# Patient Record
Sex: FEMALE | Race: WHITE | Employment: FULL TIME | URBAN - METROPOLITAN AREA
[De-identification: names, ages, dates, MRNs, and addresses within clinical notes are randomized per-mention and may not be internally consistent; named-entity substitution may affect disease eponyms.]

---

## 2020-05-18 ENCOUNTER — HOSPITAL ENCOUNTER (OUTPATIENT)
Dept: ULTRASOUND IMAGING | Age: 55
Discharge: HOME | End: 2020-05-18
Payer: COMMERCIAL

## 2020-05-18 PROCEDURE — 76705 ECHO EXAM OF ABDOMEN: CPT

## 2022-10-12 ENCOUNTER — HOSPITAL ENCOUNTER (OUTPATIENT)
Age: 57
Setting detail: OBSERVATION
Discharge: HOME | End: 2022-10-12
Attending: EMERGENCY MEDICINE | Admitting: OBSTETRICS & GYNECOLOGY
Payer: COMMERCIAL

## 2022-10-12 ENCOUNTER — ANESTHESIA EVENT (OUTPATIENT)
Dept: OPERATING ROOM | Age: 57
End: 2022-10-12
Payer: COMMERCIAL

## 2022-10-12 ENCOUNTER — ANESTHESIA (OUTPATIENT)
Dept: OPERATING ROOM | Age: 57
End: 2022-10-12
Payer: COMMERCIAL

## 2022-10-12 VITALS
TEMPERATURE: 98.4 F | RESPIRATION RATE: 18 BRPM | HEART RATE: 68 BPM | OXYGEN SATURATION: 99 % | BODY MASS INDEX: 27 KG/M2 | WEIGHT: 143 LBS | HEIGHT: 61 IN | DIASTOLIC BLOOD PRESSURE: 68 MMHG | SYSTOLIC BLOOD PRESSURE: 118 MMHG

## 2022-10-12 DIAGNOSIS — N99.820 POSTOPERATIVE HEMORRHAGE INVOLVING GENITOURINARY SYSTEM FOLLOWING GENITOURINARY PROCEDURE: Primary | ICD-10-CM

## 2022-10-12 PROBLEM — N89.8 VAGINAL HEMATOMA: Status: ACTIVE | Noted: 2022-10-12

## 2022-10-12 LAB
ABO/RH: NORMAL
ANTIBODY SCREEN: NEGATIVE
BASOPHILS ABSOLUTE: 0 K/CU MM
BASOPHILS RELATIVE PERCENT: 0.3 % (ref 0–1)
DIFFERENTIAL TYPE: ABNORMAL
EOSINOPHILS ABSOLUTE: 0.1 K/CU MM
EOSINOPHILS RELATIVE PERCENT: 0.8 % (ref 0–3)
HCT VFR BLD CALC: 36.5 % (ref 37–47)
HEMOGLOBIN: 12 GM/DL (ref 12.5–16)
IMMATURE NEUTROPHIL %: 0.2 % (ref 0–0.43)
LYMPHOCYTES ABSOLUTE: 2.3 K/CU MM
LYMPHOCYTES RELATIVE PERCENT: 25.9 % (ref 24–44)
MCH RBC QN AUTO: 30.5 PG (ref 27–31)
MCHC RBC AUTO-ENTMCNC: 32.9 % (ref 32–36)
MCV RBC AUTO: 92.9 FL (ref 78–100)
MONOCYTES ABSOLUTE: 1 K/CU MM
MONOCYTES RELATIVE PERCENT: 10.7 % (ref 0–4)
NUCLEATED RBC %: 0 %
PDW BLD-RTO: 13.7 % (ref 11.7–14.9)
PLATELET # BLD: 324 K/CU MM (ref 140–440)
PMV BLD AUTO: 9.9 FL (ref 7.5–11.1)
RBC # BLD: 3.93 M/CU MM (ref 4.2–5.4)
SEGMENTED NEUTROPHILS ABSOLUTE COUNT: 5.5 K/CU MM
SEGMENTED NEUTROPHILS RELATIVE PERCENT: 62.1 % (ref 36–66)
TOTAL IMMATURE NEUTOROPHIL: 0.02 K/CU MM
TOTAL NUCLEATED RBC: 0 K/CU MM
WBC # BLD: 8.9 K/CU MM (ref 4–10.5)

## 2022-10-12 PROCEDURE — 96372 THER/PROPH/DIAG INJ SC/IM: CPT

## 2022-10-12 PROCEDURE — 3700000000 HC ANESTHESIA ATTENDED CARE: Performed by: OBSTETRICS & GYNECOLOGY

## 2022-10-12 PROCEDURE — 2709999900 HC NON-CHARGEABLE SUPPLY: Performed by: OBSTETRICS & GYNECOLOGY

## 2022-10-12 PROCEDURE — 2580000003 HC RX 258: Performed by: OBSTETRICS & GYNECOLOGY

## 2022-10-12 PROCEDURE — 6370000000 HC RX 637 (ALT 250 FOR IP): Performed by: OBSTETRICS & GYNECOLOGY

## 2022-10-12 PROCEDURE — 96374 THER/PROPH/DIAG INJ IV PUSH: CPT

## 2022-10-12 PROCEDURE — G0378 HOSPITAL OBSERVATION PER HR: HCPCS

## 2022-10-12 PROCEDURE — 7100000011 HC PHASE II RECOVERY - ADDTL 15 MIN: Performed by: OBSTETRICS & GYNECOLOGY

## 2022-10-12 PROCEDURE — 99285 EMERGENCY DEPT VISIT HI MDM: CPT

## 2022-10-12 PROCEDURE — 6360000002 HC RX W HCPCS: Performed by: OBSTETRICS & GYNECOLOGY

## 2022-10-12 PROCEDURE — 96375 TX/PRO/DX INJ NEW DRUG ADDON: CPT

## 2022-10-12 PROCEDURE — 86850 RBC ANTIBODY SCREEN: CPT

## 2022-10-12 PROCEDURE — 86901 BLOOD TYPING SEROLOGIC RH(D): CPT

## 2022-10-12 PROCEDURE — 86900 BLOOD TYPING SEROLOGIC ABO: CPT

## 2022-10-12 PROCEDURE — 85025 COMPLETE CBC W/AUTO DIFF WBC: CPT

## 2022-10-12 PROCEDURE — 3600000012 HC SURGERY LEVEL 2 ADDTL 15MIN: Performed by: OBSTETRICS & GYNECOLOGY

## 2022-10-12 PROCEDURE — 3600000002 HC SURGERY LEVEL 2 BASE: Performed by: OBSTETRICS & GYNECOLOGY

## 2022-10-12 PROCEDURE — 3700000001 HC ADD 15 MINUTES (ANESTHESIA): Performed by: OBSTETRICS & GYNECOLOGY

## 2022-10-12 PROCEDURE — 2500000003 HC RX 250 WO HCPCS: Performed by: OBSTETRICS & GYNECOLOGY

## 2022-10-12 PROCEDURE — 7100000010 HC PHASE II RECOVERY - FIRST 15 MIN: Performed by: OBSTETRICS & GYNECOLOGY

## 2022-10-12 PROCEDURE — 6360000002 HC RX W HCPCS: Performed by: EMERGENCY MEDICINE

## 2022-10-12 PROCEDURE — 6360000002 HC RX W HCPCS

## 2022-10-12 RX ORDER — ONDANSETRON 4 MG/1
4 TABLET, ORALLY DISINTEGRATING ORAL EVERY 8 HOURS PRN
Status: DISCONTINUED | OUTPATIENT
Start: 2022-10-12 | End: 2022-10-12 | Stop reason: HOSPADM

## 2022-10-12 RX ORDER — CLINDAMYCIN PHOSPHATE 900 MG/50ML
900 INJECTION INTRAVENOUS
Status: COMPLETED | OUTPATIENT
Start: 2022-10-12 | End: 2022-10-12

## 2022-10-12 RX ORDER — DOCUSATE SODIUM 100 MG/1
100 CAPSULE, LIQUID FILLED ORAL 2 TIMES DAILY
Status: DISCONTINUED | OUTPATIENT
Start: 2022-10-12 | End: 2022-10-12 | Stop reason: HOSPADM

## 2022-10-12 RX ORDER — SODIUM CHLORIDE 9 MG/ML
INJECTION, SOLUTION INTRAVENOUS PRN
Status: DISCONTINUED | OUTPATIENT
Start: 2022-10-12 | End: 2022-10-12 | Stop reason: HOSPADM

## 2022-10-12 RX ORDER — KETOROLAC TROMETHAMINE 30 MG/ML
30 INJECTION, SOLUTION INTRAMUSCULAR; INTRAVENOUS ONCE
Status: DISCONTINUED | OUTPATIENT
Start: 2022-10-12 | End: 2022-10-12

## 2022-10-12 RX ORDER — LIDOCAINE HYDROCHLORIDE 20 MG/ML
INJECTION, SOLUTION INTRAVENOUS PRN
Status: DISCONTINUED | OUTPATIENT
Start: 2022-10-12 | End: 2022-10-12 | Stop reason: SDUPTHER

## 2022-10-12 RX ORDER — OXYCODONE HYDROCHLORIDE 5 MG/1
5 TABLET ORAL EVERY 4 HOURS PRN
Status: DISCONTINUED | OUTPATIENT
Start: 2022-10-12 | End: 2022-10-12 | Stop reason: HOSPADM

## 2022-10-12 RX ORDER — CIPROFLOXACIN 500 MG/1
500 TABLET, FILM COATED ORAL 2 TIMES DAILY
Qty: 10 TABLET | Refills: 0 | Status: SHIPPED | OUTPATIENT
Start: 2022-10-12 | End: 2022-10-17

## 2022-10-12 RX ORDER — SODIUM CHLORIDE 0.9 % (FLUSH) 0.9 %
5-40 SYRINGE (ML) INJECTION PRN
Status: DISCONTINUED | OUTPATIENT
Start: 2022-10-12 | End: 2022-10-12 | Stop reason: HOSPADM

## 2022-10-12 RX ORDER — KETOROLAC TROMETHAMINE 30 MG/ML
30 INJECTION, SOLUTION INTRAMUSCULAR; INTRAVENOUS ONCE
Status: COMPLETED | OUTPATIENT
Start: 2022-10-12 | End: 2022-10-12

## 2022-10-12 RX ORDER — PROPOFOL 10 MG/ML
INJECTION, EMULSION INTRAVENOUS PRN
Status: DISCONTINUED | OUTPATIENT
Start: 2022-10-12 | End: 2022-10-12 | Stop reason: SDUPTHER

## 2022-10-12 RX ORDER — SODIUM CHLORIDE 0.9 % (FLUSH) 0.9 %
5-40 SYRINGE (ML) INJECTION EVERY 12 HOURS SCHEDULED
Status: DISCONTINUED | OUTPATIENT
Start: 2022-10-12 | End: 2022-10-12 | Stop reason: HOSPADM

## 2022-10-12 RX ORDER — MORPHINE SULFATE 4 MG/ML
4 INJECTION, SOLUTION INTRAMUSCULAR; INTRAVENOUS ONCE
Status: DISCONTINUED | OUTPATIENT
Start: 2022-10-12 | End: 2022-10-12

## 2022-10-12 RX ORDER — OXYCODONE HYDROCHLORIDE 5 MG/1
10 TABLET ORAL EVERY 4 HOURS PRN
Status: DISCONTINUED | OUTPATIENT
Start: 2022-10-12 | End: 2022-10-12 | Stop reason: HOSPADM

## 2022-10-12 RX ORDER — ONDANSETRON 2 MG/ML
4 INJECTION INTRAMUSCULAR; INTRAVENOUS EVERY 6 HOURS PRN
Status: DISCONTINUED | OUTPATIENT
Start: 2022-10-12 | End: 2022-10-12 | Stop reason: HOSPADM

## 2022-10-12 RX ORDER — KETOROLAC TROMETHAMINE 30 MG/ML
30 INJECTION, SOLUTION INTRAMUSCULAR; INTRAVENOUS EVERY 6 HOURS PRN
Status: DISCONTINUED | OUTPATIENT
Start: 2022-10-12 | End: 2022-10-12 | Stop reason: HOSPADM

## 2022-10-12 RX ADMIN — CLINDAMYCIN IN 5 PERCENT DEXTROSE 900 MG: 18 INJECTION, SOLUTION INTRAVENOUS at 06:30

## 2022-10-12 RX ADMIN — OXYCODONE HYDROCHLORIDE 10 MG: 5 TABLET ORAL at 07:44

## 2022-10-12 RX ADMIN — KETOROLAC TROMETHAMINE 30 MG: 30 INJECTION, SOLUTION INTRAMUSCULAR at 04:39

## 2022-10-12 RX ADMIN — MORPHINE SULFATE 4 MG: 4 INJECTION, SOLUTION INTRAMUSCULAR; INTRAVENOUS at 04:00

## 2022-10-12 RX ADMIN — KETOROLAC TROMETHAMINE 30 MG: 30 INJECTION, SOLUTION INTRAMUSCULAR; INTRAVENOUS at 07:43

## 2022-10-12 RX ADMIN — HYDROMORPHONE HYDROCHLORIDE 0.5 MG: 1 INJECTION, SOLUTION INTRAMUSCULAR; INTRAVENOUS; SUBCUTANEOUS at 04:36

## 2022-10-12 RX ADMIN — LIDOCAINE HYDROCHLORIDE 100 MG: 20 INJECTION, SOLUTION INTRAVENOUS at 06:33

## 2022-10-12 RX ADMIN — OXYCODONE HYDROCHLORIDE 5 MG: 5 TABLET ORAL at 17:53

## 2022-10-12 RX ADMIN — SODIUM CHLORIDE, PRESERVATIVE FREE 10 ML: 5 INJECTION INTRAVENOUS at 14:09

## 2022-10-12 RX ADMIN — DOCUSATE SODIUM 100 MG: 100 CAPSULE, LIQUID FILLED ORAL at 11:50

## 2022-10-12 RX ADMIN — PROPOFOL 240 MG: 10 INJECTION, EMULSION INTRAVENOUS at 06:33

## 2022-10-12 RX ADMIN — KETOROLAC TROMETHAMINE 30 MG: 30 INJECTION, SOLUTION INTRAMUSCULAR; INTRAVENOUS at 14:10

## 2022-10-12 RX ADMIN — OXYCODONE HYDROCHLORIDE 10 MG: 5 TABLET ORAL at 11:41

## 2022-10-12 ASSESSMENT — PAIN DESCRIPTION - ORIENTATION
ORIENTATION: ANTERIOR
ORIENTATION: POSTERIOR
ORIENTATION: LEFT
ORIENTATION: LEFT
ORIENTATION: ANTERIOR

## 2022-10-12 ASSESSMENT — PAIN SCALES - GENERAL
PAINLEVEL_OUTOF10: 7
PAINLEVEL_OUTOF10: 9
PAINLEVEL_OUTOF10: 7
PAINLEVEL_OUTOF10: 6
PAINLEVEL_OUTOF10: 4
PAINLEVEL_OUTOF10: 10
PAINLEVEL_OUTOF10: 9
PAINLEVEL_OUTOF10: 4
PAINLEVEL_OUTOF10: 7

## 2022-10-12 ASSESSMENT — PAIN DESCRIPTION - DESCRIPTORS
DESCRIPTORS: CRAMPING;THROBBING
DESCRIPTORS: STABBING
DESCRIPTORS: STABBING
DESCRIPTORS: THROBBING
DESCRIPTORS: ACHING
DESCRIPTORS: CRAMPING
DESCRIPTORS: CRAMPING
DESCRIPTORS: DISCOMFORT

## 2022-10-12 ASSESSMENT — PAIN DESCRIPTION - LOCATION
LOCATION: VAGINA
LOCATION: ABDOMEN
LOCATION: BUTTOCKS
LOCATION: VAGINA
LOCATION: BACK;BUTTOCKS;HIP
LOCATION: BUTTOCKS
LOCATION: VAGINA
LOCATION: ABDOMEN
LOCATION: BUTTOCKS

## 2022-10-12 ASSESSMENT — PAIN - FUNCTIONAL ASSESSMENT
PAIN_FUNCTIONAL_ASSESSMENT: 0-10
PAIN_FUNCTIONAL_ASSESSMENT: ACTIVITIES ARE NOT PREVENTED
PAIN_FUNCTIONAL_ASSESSMENT: PREVENTS OR INTERFERES WITH MANY ACTIVE NOT PASSIVE ACTIVITIES
PAIN_FUNCTIONAL_ASSESSMENT: ACTIVITIES ARE NOT PREVENTED
PAIN_FUNCTIONAL_ASSESSMENT: ACTIVITIES ARE NOT PREVENTED
PAIN_FUNCTIONAL_ASSESSMENT: PREVENTS OR INTERFERES SOME ACTIVE ACTIVITIES AND ADLS

## 2022-10-12 ASSESSMENT — PAIN DESCRIPTION - FREQUENCY
FREQUENCY: CONTINUOUS

## 2022-10-12 ASSESSMENT — PAIN DESCRIPTION - PAIN TYPE
TYPE: ACUTE PAIN
TYPE: SURGICAL PAIN

## 2022-10-12 ASSESSMENT — PAIN DESCRIPTION - ONSET
ONSET: ON-GOING
ONSET: ON-GOING
ONSET: SUDDEN

## 2022-10-12 NOTE — PROGRESS NOTES
1701 Pt. Brought back from OR, pt. Hooked up to monitors, VSS, pt. Remains sleepy, will awake when spoken to. Pt. Has minimal bleeding noted, pain 7/10. 0725 Report given to Marycruz Moura RN. Pt. Spouse at bedside, call light in reach.

## 2022-10-12 NOTE — ED NOTES
Patient O2 dropped to 89% on room air from 94%. Placed patient on NC at 4L and O2 is now stable at 97%.      Cameron Escalante RN  10/12/22 6987

## 2022-10-12 NOTE — FLOWSHEET NOTE
Pt arrives from Surgery PACU and bedside report taken from [facility]. IV infusing well left viktor with no signs infiltration. AM re-assessment complete. Bilateral breath sounds clear on auscultation. P02 98%. Pt denies being smoker. Oriented to room, discussed pain levels and pain meds available. Abdomen soft with vaginal packing intact. Discussed plan of care. Side rails up x 2 with continuous O2 running. SCD's in place. Supportive  Tamara Farrell sat bedside.

## 2022-10-12 NOTE — PROGRESS NOTES
Patient taken from [facility] to Mother-Baby unit by this RN. Patient spouse with patient with all patient belongings. Patient appears in no acute distress, A+Ox4, respirations equal and unlabored.

## 2022-10-12 NOTE — PROGRESS NOTES
DOing well. Pain is well controlled. Eating, ambulating, passing flatus,  Tolerating diet  Desires D/C  AFEB, VSS  Packing removed  A/P POD 1, 0 after take back for hematoma evacuation  Percocet, cipro BID for 5 days. F/u in office as scheduled.

## 2022-10-12 NOTE — PROGRESS NOTES
4898- Patient report from Jignesh Denis RN, rates pain 7/10  0735- Warm blankets and socks applied, awaiting pharmacy to verify med orders for pain  0743- Patient medicated for pain  0749- Attempted to call kim RN to call back  0800- Reassessment complete, scant amount of drainage noted on unruly-pad  0815- Patient report called to Janeth Marlow RN mother-baby unit

## 2022-10-12 NOTE — ED NOTES
ED TO INPATIENT SBAR HANDOFF    Patient Name: Vicky Wynne   :  1965  65 y.o. MRN:  [id number]  Preferred Name  NA  ED Room #:  ED31/ED-31  Family/Caregiver Present no   Restraints no   Sitter no   Sepsis Risk Score Sepsis Risk Score: 0.81    Situation  Code Status: No Order No additional code details. Allergies: Penicillins  Weight: Patient Vitals for the past 96 hrs (Last 3 readings):   Weight   10/12/22 0414 143 lb (64.9 kg)     Arrived from: home  Chief Complaint:   Chief Complaint   Patient presents with    Post-op Problem     Patient states she has pain and pressure in area of her surgery, pain also in her lower back and left thigh and hip area. Hospital Problem/Diagnosis:  Active Problems:    * No active hospital problems. *  Resolved Problems:    * No resolved hospital problems. *    Imaging:   No orders to display     Abnormal labs: Abnormal Labs Reviewed - No abnormal labs to display  Critical values: no     Abnormal Assessment Findings: Vaginal bleeding.     Background  Hospital admissions in last 30 days?  no   History:   Past Medical History:   Diagnosis Date    Anxiety     Hypothyroidism        Assessment    Vitals/MEWS: MEWS Score: 1  Level of Consciousness: Alert (0)   Vitals:    10/12/22 0414 10/12/22 0500   BP: (!) 167/99 (!) 149/85   Pulse: 86 88   Resp: 18 18   Temp: 98.8 °F (37.1 °C) 98.8 °F (37.1 °C)   TempSrc: Oral Oral   SpO2: 93% 96%   Weight: 143 lb (64.9 kg)    Height: 5' 1\" (1.549 m)    PF:  97 L/min     FiO2 (%): 97  O2 Flow Rate: O2 Device: None (Room air)    Cardiac Rhythm:    Pain Assessment: na [x] Verbal [] [place] Pe[place] Scale  Pain Scale: Pain Assessment  Pain Assessment: 0-10  Pain Level: 9  Patient's Stated Pain Goal: 0 - No pain  Pain Location: Buttocks  Pain Orientation: Left  Pain Descriptors: Stabbing  Functional Pain Assessment: Prevents or interferes with many active not passive activities  Pain Type: Acute pain  Pain Frequency: Continuous  Pain Onset: Sudden  Last documented pain score (0-10 scale) Pain Level: 9  Last documented pain medication administered: Dilaudid @0436  Mental Status: oriented  C-SSRS:    Bedside swallow:    Trini Coma Scale (GCS): Trini Coma Scale  Eye Opening: Spontaneous  Best Verbal Response: Oriented  Best Motor Response: Obeys commands  [place] Coma Scale Score: 15  Active LDA's:   Peripheral IV 10/12/22 Right; Anterior Forearm (Active)     PO Status: Regular  Pertinent or High Risk Medications/Drips: no   If Yes, please provide details: na    Pending Blood Product Administration: no     Blood Cultures: see ED pt care timeline or ED Event log    Recommendation    Pending orders na  Plan for Discharge (if known): Additional Comments: Post op complication, surgeon at bedside.     If any further questions, please call Sending RN at [phone]    Electronically signed by: Electronically signed by Crystal Eller RN on 10/12/2022 at 5:29 AM       Crystal Eller RN  10/12/22 2208

## 2022-10-12 NOTE — H&P
Department of Gynecology  History & Physical            CHIEF COMPLAINT:     Chief Complaint   Patient presents with    Post-op Problem     Patient states she has pain and pressure in area of her surgery, pain also in her lower back and left thigh and hip area. Acute pain this evening with attempted bowel movement s/p Rectocele repair POD #1    HISTORY OF PRESENT ILLNESS:     The patient is a 65y.o. year old female,  with perimenopausal bleeding and low symptomatic rectocele had a H/S D&C yesterday with low rectocele repair at [facility] at noon. Was discharged home and had acute pelvic pain and bleeding that began last night after trying to have a bowel movement. Chief Complaint   Patient presents with    Post-op Problem     Patient states she has pain and pressure in area of her surgery, pain also in her lower back and left thigh and hip area. .    Past Medical History:        Diagnosis Date    Anxiety     Hypothyroidism      Past Surgical History:    Rectocele repair POD #1    Past Gynecological History:    Sexually transmitted disease history: none        A. Number of sexual partners in the last 6 months: 1    meds:No current facility-administered medications for this encounter.     Current Outpatient Medications:     buPROPion (WELLBUTRIN XL) 300 MG extended release tablet, TAKE 1 TABLET BY MOUTH EVERY DAY, Disp: , Rfl:     levothyroxine (SYNTHROID) 75 MCG tablet, TAKE 1 TABLET BY MOUTH EVERY DAY, Disp: , Rfl:     ibuprofen (ADVIL;MOTRIN) 800 MG tablet, TAKE 1 TABLET BY MOUTH THREE TIMES DAILY WITH FOOD, Disp: , Rfl:     LO LOESTRIN FE 1 MG-10 MCG / 10 MCG tablet, TAKE 1 TABLET BY MOUTH EVERY DAY, Disp: , Rfl:     omeprazole (PRILOSEC) 20 MG delayed release capsule, TAKE ONE CAPSULE BY MOUTH 2 TIMES EVERY DAY FOR 90 DAYS, Disp: , Rfl:     ondansetron (ZOFRAN) 4 MG tablet, TAKE 1 2 TABLET BY ORAL ROUTE EVERY 8 HOURS AS NEEDED FOR 90 DAYS, Disp: , Rfl:     TRINTELLIX 20 MG TABS tablet, TAKE 1 TABLET (20 MG) BY ORAL ROUTE ONCE DAILY AT THE SAME TIME EACH DAY FOR 90 DAYS, Disp: , Rfl:     temazepam (RESTORIL) 30 MG capsule, TAKE 1 CAPSULE BY MOUTH AT BEDTIME, Disp: , Rfl:        Allergies:  Penicillins     Social History:  TOBACCO:   reports that she has never smoked. She has never used smokeless tobacco.    Family History:   No family history on file. PHYSICAL EXAM:    Vitals:  BP (!) 167/99   Pulse 86   Temp 98.8 °F (37.1 °C) (Oral)   Resp 18   Ht 5' 1\" (1.549 m)   Wt 143 lb (64.9 kg)   SpO2 93%   BMI 27.02 kg/m²     BACK:  Symmetric, no curvature, spinous processes are non-tender on palpation, paraspinous muscles are non-tender on palpation, no costal vertebral tenderness  LUNGS:  No increased work of breathing, good air exchange, clear to auscultation bilaterally, no crackles or wheezing  CARDIOVASCULAR:  Normal apical impulse, regular rate and rhythm, normal S1 and S2, no S3 or S4, and no murmur noted  ABDOMEN:  No scars, normal bowel sounds, soft, non-distended, non-tender, no masses palpated, no hepatosplenomegally   Pelvic exam shows left sided hematoma of the paravaginal space. Suture were released and she has active ongoing bleeding coming from the vaginal sidewall. It is a small actively bleeding arteriole that is visualized, but is deep and well need better retraction and light to control. Clot expressed. Will need surgical re-exploration to get the active bleeding. DATA:  Labs:  CBC: No results found for: WBC, RBC, HGB, HCT, MCV, RDW, PLT    IMPRESSION/RECOMMENDATIONS:      Acute vaginal hematoma post-op from a rectocele repair yesterday. Post vaginal wall opened in the ER and hematoma released. Active bleeding from a small arteriole is noted. Will discuss with nursing supervisor and will need anesthesia to better evacuate and find and control the bleeding. Will plan OR for vagina wall hematoma evacuation and possible packing. Consent is signed.     Jose Banegas MD  10/12/2022  5:16 AM

## 2022-10-12 NOTE — ANESTHESIA PRE PROCEDURE
Department of Anesthesiology  Preprocedure Note       Name:  Alivia Walker   Age:  65 y.o.  :  1965                                          MRN:  [id number]         Date:  10/12/2022      Surgeon: * No surgeons listed *    Procedure: * No procedures listed *    Medications prior to admission:   Prior to Admission medications    Medication Sig Start Date End Date Taking? Authorizing Provider   buPROPion (WELLBUTRIN XL) 300 MG extended release tablet TAKE 1 TABLET BY MOUTH EVERY DAY 20   Historical Provider, MD   levothyroxine (SYNTHROID) 75 MCG tablet TAKE 1 TABLET BY MOUTH EVERY DAY 20   Historical Provider, MD   ibuprofen (ADVIL;MOTRIN) 800 MG tablet TAKE 1 TABLET BY MOUTH THREE TIMES DAILY WITH FOOD 20   Historical Provider, MD BAGLEY LOESTRIN FE 1 MG-10 MCG / 10 MCG tablet TAKE 1 TABLET BY MOUTH EVERY DAY 20   Historical Provider, MD   omeprazole (PRILOSEC) 20 MG delayed release capsule TAKE ONE CAPSULE BY MOUTH 2 TIMES EVERY DAY FOR 90 DAYS 20   Historical Provider, MD   ondansetron (ZOFRAN) 4 MG tablet TAKE 1 2 TABLET BY ORAL ROUTE EVERY 8 HOURS AS NEEDED FOR 90 DAYS 20   Historical Provider, MD   TRINTELLIX 20 MG TABS tablet TAKE 1 TABLET (20 MG) BY ORAL ROUTE ONCE DAILY AT THE SAME TIME EACH DAY FOR 90 DAYS 20   Historical Provider, MD   temazepam (RESTORIL) 30 MG capsule TAKE 1 CAPSULE BY MOUTH AT BEDTIME 20   Historical Provider, MD       Current medications:    No current facility-administered medications for this encounter.      Current Outpatient Medications   Medication Sig Dispense Refill    buPROPion (WELLBUTRIN XL) 300 MG extended release tablet TAKE 1 TABLET BY MOUTH EVERY DAY      levothyroxine (SYNTHROID) 75 MCG tablet TAKE 1 TABLET BY MOUTH EVERY DAY      ibuprofen (ADVIL;MOTRIN) 800 MG tablet TAKE 1 TABLET BY MOUTH THREE TIMES DAILY WITH FOOD      LO LOESTRIN FE 1 MG-10 MCG / 10 MCG tablet TAKE 1 TABLET BY MOUTH EVERY DAY      omeprazole (PRILOSEC) 20 MG delayed release capsule TAKE ONE CAPSULE BY MOUTH 2 TIMES EVERY DAY FOR 90 DAYS      ondansetron (ZOFRAN) 4 MG tablet TAKE 1 2 TABLET BY ORAL ROUTE EVERY 8 HOURS AS NEEDED FOR 90 DAYS      TRINTELLIX 20 MG TABS tablet TAKE 1 TABLET (20 MG) BY ORAL ROUTE ONCE DAILY AT THE SAME TIME EACH DAY FOR 90 DAYS      temazepam (RESTORIL) 30 MG capsule TAKE 1 CAPSULE BY MOUTH AT BEDTIME         Allergies: Allergies   Allergen Reactions    Penicillins Anaphylaxis       Problem List:  There is no problem list on file for this patient. Past Medical History:        Diagnosis Date    Anxiety     Hypothyroidism        Past Surgical History:  No past surgical history on file. Social History:    Social History     Tobacco Use    Smoking status: Never    Smokeless tobacco: Never   Substance Use Topics    Alcohol use: Never                                Counseling given: Not Answered      Vital Signs (Current):   Vitals:    10/12/22 0414 10/12/22 0500   BP: (!) 167/99 (!) 149/85   Pulse: 86 88   Resp: 18 18   Temp: 98.8 °F (37.1 °C) 98.8 °F (37.1 °C)   TempSrc: Oral Oral   SpO2: 93% 96%   Weight: 143 lb (64.9 kg)    Height: 5' 1\" (1.549 m)    PF:  97 L/min                                              BP Readings from Last 3 Encounters:   10/12/22 (!) 149/85       NPO Status:                                                                                 BMI:   Wt Readings from Last 3 Encounters:   10/12/22 143 lb (64.9 kg)   06/22/20 164 lb (74.4 kg)   06/03/20 167 lb (75.8 kg)     Body mass index is 27.02 kg/m².     CBC:   Lab Results   Component Value Date/Time    WBC 8.9 10/12/2022 05:25 AM    RBC 3.93 10/12/2022 05:25 AM    HGB 12.0 10/12/2022 05:25 AM    HCT 36.5 10/12/2022 05:25 AM    MCV 92.9 10/12/2022 05:25 AM    RDW 13.7 10/12/2022 05:25 AM     10/12/2022 05:25 AM       CMP: No results found for: NA, K, CL, CO2, BUN, CREATININE, GFRAA, AGRATIO, LABGLOM, GLUCOSE, GLU, PROT, CALCIUM, BILITOT, ALKPHOS, AST, ALT    POC Tests: No results for input(s): POCGLU, POCNA, POCK, POCCL, POCBUN, POCHEMO, POCHCT in the last 72 hours. Coags: No results found for: PROTIME, INR, APTT    HCG (If Applicable): No results found for: PREGTESTUR, PREGSERUM, HCG, HCGQUANT     ABGs: No results found for: PHART, PO2ART, DOT7WQI, JKP8FLW, BEART, Q6IIHRCJ     Type & Screen (If Applicable):  No results found for: LABABO, LABRH    Drug/Infectious Status (If Applicable):  No results found for: HIV, HEPCAB    COVID-19 Screening (If Applicable): No results found for: COVID19        Anesthesia Evaluation   no history of anesthetic complications:   Airway: Mallampati: II  TM distance: >3 FB   Neck ROM: full  Mouth opening: > = 3 FB   Dental: normal exam         Pulmonary:Negative Pulmonary ROS and normal exam                               Cardiovascular:Negative CV ROS  Exercise tolerance: good (>4 METS),            Beta Blocker:  Not on Beta Blocker         Neuro/Psych:   Negative Neuro/Psych ROS              GI/Hepatic/Renal:             Endo/Other:    (+) hypothyroidism::., .                 Abdominal:             Vascular: negative vascular ROS. Other Findings:           Anesthesia Plan      MAC     ASA 2       Induction: intravenous. Anesthetic plan and risks discussed with patient. Plan discussed with CRNA.                     Samantha Ren MD   10/12/2022

## 2022-10-12 NOTE — DISCHARGE SUMMARY
Physician Discharge Summary     Patient ID:  Esther Alvarez  [id number]  89 y.o.  1965    Admit date: 10/12/2022    Discharge date and time:  10/12/22    Admitting Physician: Shanique Daniels MD    Discharge Diagnoses: Postoperative hemorrhage involving genitourinary system following genitourinary procedure [N99.820]  Vaginal hematoma [N89.8]    Discharged Condition: good    Indication for Admission: vaginal hematoma s/p posterior repair. Procedures Performed: evacuation of vaginal hematoma    Hospital Course: Patient was admitted on the day of surgery, and underwent uncomplicated evacuation    Discharge Exam: vagina with no hematoma visible or active bleding      Disposition: home    Patient Instructions: Activity: no lifting, Driving, or Strenuous exercise for 10 days  Diet: regular diet  Wound Care: none needed    Discharge Medication:      Medication List        START taking these medications      ciprofloxacin 500 MG tablet  Commonly known as: CIPRO  Take 1 tablet by mouth 2 times daily for 5 days            CONTINUE taking these medications      buPROPion 300 MG extended release tablet  Commonly known as: WELLBUTRIN XL     ibuprofen 800 MG tablet  Commonly known as: ADVIL;MOTRIN     levothyroxine 75 MCG tablet  Commonly known as: SYNTHROID     omeprazole 20 MG delayed release capsule  Commonly known as: PRILOSEC     ondansetron 4 MG tablet  Commonly known as: ZOFRAN     temazepam 30 MG capsule  Commonly known as: RESTORIL     Trintellix 20 MG Tabs tablet  Generic drug: VORTIoxetine HBr            STOP taking these medications      Lo Loestrin Fe 1 MG-10 MCG / 10 MCG tablet  Generic drug: norethindrone-ethinyl estradiol-Fe               Where to Get Your Medications        You can get these medications from any pharmacy    Bring a paper prescription for each of these medications  ciprofloxacin 500 MG tablet          Follow-up with Shanique Daniels MD in 2 weeks.     Signed:  Vianca Naik MD Paulie  10/12/2022  5:36 PM

## 2022-10-12 NOTE — ED PROVIDER NOTES
Triage Chief Complaint:   Post-op Problem (Patient states she has pain and pressure in area of her surgery, pain also in her lower back and left thigh and hip area. )    [place]:  Alivia Walker is a 65 y.o. female that presents with postoperative pain. Yesterday, the patient had rectocele repair by Dr. Pedro Bowman. States that since arriving home she has had severe rectal pain that radiates to her left buttock. States that the pain feels like labor pains which she is unable to push. She has not had a bowel movement since returning home. Denies any nausea, vomiting, fevers, abdominal pain. She has taken Norco without improvement. States that she has had a small amount of light rectal bleeding. No other acute complaints at this time. ROS:  At least 10 systems reviewed and otherwise acutely negative except as in the [address] Past Medical History:   Diagnosis Date    Anxiety     Hypothyroidism      No past surgical history on file. No family history on file. Social History     Socioeconomic History    Marital status:      Spouse name: Not on file    Number of children: Not on file    Years of education: Not on file    Highest education level: Not on file   Occupational History    Not on file   Tobacco Use    Smoking status: Never    Smokeless tobacco: Never   Vaping Use    Vaping Use: Never used   Substance and Sexual Activity    Alcohol use: Never    Drug use: Never    Sexual activity: Not on file   Other Topics Concern    Not on file   Social History Narrative    Not on file     Social Determinants of Health     Financial Resource Strain: Not on file   Food Insecurity: Not on file   Transportation Needs: Not on file   Physical Activity: Not on file   Stress: Not on file   Social Connections: Not on file   Intimate Partner Violence: Not on file   Housing Stability: Not on file     No current facility-administered medications for this encounter.      Current Outpatient Medications   Medication Sig Dispense Refill    buPROPion (WELLBUTRIN XL) 300 MG extended release tablet TAKE 1 TABLET BY MOUTH EVERY DAY      levothyroxine (SYNTHROID) 75 MCG tablet TAKE 1 TABLET BY MOUTH EVERY DAY      ibuprofen (ADVIL;MOTRIN) 800 MG tablet TAKE 1 TABLET BY MOUTH THREE TIMES DAILY WITH FOOD      LO LOESTRIN FE 1 MG-10 MCG / 10 MCG tablet TAKE 1 TABLET BY MOUTH EVERY DAY      omeprazole (PRILOSEC) 20 MG delayed release capsule TAKE ONE CAPSULE BY MOUTH 2 TIMES EVERY DAY FOR 90 DAYS      ondansetron (ZOFRAN) 4 MG tablet TAKE 1 2 TABLET BY ORAL ROUTE EVERY 8 HOURS AS NEEDED FOR 90 DAYS      TRINTELLIX 20 MG TABS tablet TAKE 1 TABLET (20 MG) BY ORAL ROUTE ONCE DAILY AT THE SAME TIME EACH DAY FOR 90 DAYS      temazepam (RESTORIL) 30 MG capsule TAKE 1 CAPSULE BY MOUTH AT BEDTIME       Allergies   Allergen Reactions    Penicillins Anaphylaxis       Nursing Notes Reviewed    Physical Exam:  ED Triage Vitals [10/12/22 0414]   Enc Vitals Group      BP (!) 167/99      Heart Rate 86      Resp 18      Temp 98.8 °F (37.1 °C)      Temp Source Oral      SpO2 93 %      Weight 143 lb (64.9 kg)      Height 5' 1\" (1.549 m)      Head Circumference       Peak Flow       Pain Score       Pain Loc       Pain Edu? Excl. in [address]? GENERAL APPEARANCE: Awake and alert. Cooperative. No acute distress. HEAD: Normocephalic. Atraumatic. EYES: EOM's grossly intact. Sclera anicteric. ENT: Mucous membranes are moist. Tolerates saliva. No trismus. NECK: Supple. No meningismus. Trachea midline. HEART: RRR. Radial pulses 2+. LUNGS: Respirations unlabored. CTAB  ABDOMEN: Soft. Non-tender. No guarding or rebound. RECTAL: No active bleeding. No hemorrhoids or masses. EXTREMITIES: No acute deformities. SKIN: Warm and dry. NEUROLOGICAL: No gross facial drooping. Moves all 4 extremities spontaneously. PSYCHIATRIC: Normal mood.     I have reviewed and interpreted all of the currently available lab results from this visit (if applicable):  No results found for this visit on 10/12/22. Radiographs (if obtained):  [] The following radiograph was interpreted by myself in the absence of a radiologist:  [] Radiologist's Report Reviewed:    EKG (if obtained): (All EKG's are interpreted by myself in the absence of a cardiologist)    MDM:  Plan of care is discussed thoroughly with the patient and family if present. If performed, all imaging and lab work also discussed with patient. All relevant prior results and chart reviewed if available. Patient presents as above. Vital signs are normal.  She has benign abdominal exam.  Presents with rectal pain status post rectocele repair. I spoke with Dr. Noelle Mcclure who had sent the patient in and he is coming in to see the patient for evaluation. He requested the patient to have a milligram of Dilaudid IV for pain control. I had already ordered IM Toradol and morphine for the patient which she had received. I had an IV placed and an additional 0.5 mg of Dilaudid was given. After examination by Dr. Noelle Mcclure, plan to take to the OR for evacuation of posterior vaginal wall hematoma. Clinical Impression:  1.  Postoperative hemorrhage involving genitourinary system following genitourinary procedure      (Please note that portions of this note may have been completed with a voice recognition program. Efforts were made to edit the dictations but occasionally words are mis-transcribed.)    MD Court Small MD  10/12/22 5026

## 2022-10-12 NOTE — DISCHARGE INSTRUCTIONS
Discharge Instructions for vaginal Surgery    Follow up with your Physician when instructed and make appt for 2 weeks for Office visit with Dr Shu Irizarry. Call for your appointment. Call your Physician If:    You notice you are saturating through a unruly pad in one hour     You start running a fever of 100.4 or above    You notice a foul odor to your vaginal discharge    If you can not urinate or feel you still have to urinate even after you have gone to the bathroom. Activity:     Do walk but not to excess. Start off slow and gradually increase your activity such as housework when your physician allows you to. Don't lift anything heavier than a gallon of milk. Take stairs slowly, only a flight up and down every few hours. No driving if you are still taking narcotic medications of any kind. You may shower, but do not soak in a tub of water for several weeks or when your doctor says its ok to bath in a tub of water. You may be a passenger in an automobile, but only travel short distances. You may shower. No sexual activity and nothing in vagina such as tampons or douches. Diet:    Eat foods high in protein to help your body heal better. ( Meats and Dairy)    Increase fruits and vegetables to assist with bowel elimination.  ( Beware of foods that make you gassy; onions, broccoli, and cauliflower)

## 2022-10-13 NOTE — FLOWSHEET NOTE
Dr Goldy Senior removed vaginal packing with no blood show seen. Packing Completely removed. Pt talaktive and smiling. Pain # 0.

## 2022-12-22 ENCOUNTER — HOSPITAL ENCOUNTER (EMERGENCY)
Age: 57
Discharge: HOME | End: 2022-12-22
Payer: COMMERCIAL

## 2022-12-22 ENCOUNTER — APPOINTMENT (OUTPATIENT)
Dept: GENERAL RADIOLOGY | Age: 57
End: 2022-12-22
Payer: COMMERCIAL

## 2022-12-22 VITALS
HEART RATE: 82 BPM | DIASTOLIC BLOOD PRESSURE: 82 MMHG | WEIGHT: 143 LBS | OXYGEN SATURATION: 99 % | TEMPERATURE: 97.9 F | SYSTOLIC BLOOD PRESSURE: 141 MMHG | HEIGHT: 61 IN | BODY MASS INDEX: 27 KG/M2 | RESPIRATION RATE: 18 BRPM

## 2022-12-22 DIAGNOSIS — S62.609B OPEN FRACTURE OF PHALANX OF DIGIT OF HAND, INITIAL ENCOUNTER: ICD-10-CM

## 2022-12-22 DIAGNOSIS — S69.92XA INJURY OF LEFT HAND, INITIAL ENCOUNTER: Primary | ICD-10-CM

## 2022-12-22 PROCEDURE — 73130 X-RAY EXAM OF HAND: CPT

## 2022-12-22 PROCEDURE — 12001 RPR S/N/AX/GEN/TRNK 2.5CM/<: CPT

## 2022-12-22 PROCEDURE — 2500000003 HC RX 250 WO HCPCS: Performed by: NURSE PRACTITIONER

## 2022-12-22 PROCEDURE — 6370000000 HC RX 637 (ALT 250 FOR IP): Performed by: NURSE PRACTITIONER

## 2022-12-22 PROCEDURE — 90471 IMMUNIZATION ADMIN: CPT | Performed by: NURSE PRACTITIONER

## 2022-12-22 PROCEDURE — 90715 TDAP VACCINE 7 YRS/> IM: CPT | Performed by: NURSE PRACTITIONER

## 2022-12-22 PROCEDURE — 96372 THER/PROPH/DIAG INJ SC/IM: CPT

## 2022-12-22 PROCEDURE — 6360000002 HC RX W HCPCS: Performed by: NURSE PRACTITIONER

## 2022-12-22 RX ORDER — KETOROLAC TROMETHAMINE 30 MG/ML
30 INJECTION, SOLUTION INTRAMUSCULAR; INTRAVENOUS ONCE
Status: COMPLETED | OUTPATIENT
Start: 2022-12-22 | End: 2022-12-22

## 2022-12-22 RX ORDER — CLINDAMYCIN HYDROCHLORIDE 150 MG/1
450 CAPSULE ORAL 3 TIMES DAILY
Qty: 63 CAPSULE | Refills: 0 | Status: SHIPPED | OUTPATIENT
Start: 2022-12-22 | End: 2022-12-29

## 2022-12-22 RX ORDER — OXYCODONE HYDROCHLORIDE AND ACETAMINOPHEN 5; 325 MG/1; MG/1
1 TABLET ORAL ONCE
Status: COMPLETED | OUTPATIENT
Start: 2022-12-22 | End: 2022-12-22

## 2022-12-22 RX ORDER — OXYCODONE HYDROCHLORIDE AND ACETAMINOPHEN 5; 325 MG/1; MG/1
1 TABLET ORAL EVERY 6 HOURS PRN
Qty: 12 TABLET | Refills: 0 | Status: SHIPPED | OUTPATIENT
Start: 2022-12-22 | End: 2022-12-25

## 2022-12-22 RX ORDER — GINSENG 100 MG
CAPSULE ORAL ONCE
Status: COMPLETED | OUTPATIENT
Start: 2022-12-22 | End: 2022-12-22

## 2022-12-22 RX ORDER — LIDOCAINE HYDROCHLORIDE 10 MG/ML
5 INJECTION, SOLUTION INFILTRATION; PERINEURAL ONCE
Status: COMPLETED | OUTPATIENT
Start: 2022-12-22 | End: 2022-12-22

## 2022-12-22 RX ADMIN — TETANUS TOXOID, REDUCED DIPHTHERIA TOXOID AND ACELLULAR PERTUSSIS VACCINE, ADSORBED 0.5 ML: 5; 2.5; 8; 8; 2.5 SUSPENSION INTRAMUSCULAR at 13:31

## 2022-12-22 RX ADMIN — LIDOCAINE HYDROCHLORIDE 5 ML: 10 INJECTION, SOLUTION EPIDURAL; INFILTRATION; INTRACAUDAL; PERINEURAL at 13:31

## 2022-12-22 RX ADMIN — OXYCODONE HYDROCHLORIDE AND ACETAMINOPHEN 1 TABLET: 5; 325 TABLET ORAL at 14:32

## 2022-12-22 RX ADMIN — BACITRACIN: 500 OINTMENT TOPICAL at 15:02

## 2022-12-22 RX ADMIN — KETOROLAC TROMETHAMINE 30 MG: 30 INJECTION, SOLUTION INTRAMUSCULAR; INTRAVENOUS at 14:33

## 2022-12-22 ASSESSMENT — PAIN SCALES - GENERAL: PAINLEVEL_OUTOF10: 10

## 2022-12-22 NOTE — Clinical Note
Fredi Garza was seen and treated in our emergency department on 12/22/2022. She may return to work on 12/26/2022. If you have any questions or concerns, please don't hesitate to call.       Helena Matthews, KASH - CNP

## 2022-12-22 NOTE — ED PROVIDER NOTES
EMERGENCY DEPARTMENT ENCOUNTER        PCP: Madina Torres MD    CHIEF COMPLAINT    Chief Complaint   Patient presents with    Hand Injury     Pt slammed her finger inside a car door. left ring finger         This patient was not evaluated by the attending physician. I have independently evaluated this patient . HPI    Gia Jaquez is a 65 y.o. female who presents with complaints of left hand pain and finger laceration. She states she is slammed her finger in the car door. She is complaining of pain in her second, third, fourth, and fifth fingers and has a laceration on the palmar aspect of the left ring finger. She rates her pain as moderate in intensity, aching and throbbing, and constant. Range of motion palpation exacerbate her symptoms, nothing has alleviated her symptoms. She has not taken any medications. Unknown tetanus status. She is right-hand dominant. No distal numbness, tingling, weakness, functional/motor deficit. Pt denies foreign body sensation. REVIEW OF SYSTEMS    General:   Denies symptoms preceding injury. Skin: + Laceration. SEE HPI  Musculoskeletal:  No distal numbness, tingling. No obvious tendon or motor deficits. Denies any other musculoskeletal injuries or skin trauma. All other review of systems are negative  See HPI and nursing notes for additional information     PAST MEDICAL & SURGICAL HISTORY    Past Medical History:   Diagnosis Date    Anxiety     Hypothyroidism      Past Surgical History:   Procedure Laterality Date    VAGINA SURGERY N/A 10/12/2022    VAGINAL POSTERIOR REPAIR performed by Hiwot Bishop MD at [place] [address]    Current Outpatient Rx   Medication Sig Dispense Refill    clindamycin (CLEOCIN) 150 MG capsule Take 3 capsules by mouth 3 times daily for 7 days 63 capsule 0    oxyCODONE-acetaminophen (PERCOCET) 5-325 MG per tablet Take 1 tablet by mouth every 6 hours as needed for Pain for up to 3 days.  Intended supply: 3 days. Take lowest dose possible to manage pain Max Daily Amount: 4 tablets 12 tablet 0    buPROPion (WELLBUTRIN XL) 300 MG extended release tablet TAKE 1 TABLET BY MOUTH EVERY DAY      levothyroxine (SYNTHROID) 75 MCG tablet TAKE 1 TABLET BY MOUTH EVERY DAY      ibuprofen (ADVIL;MOTRIN) 800 MG tablet TAKE 1 TABLET BY MOUTH THREE TIMES DAILY WITH FOOD      omeprazole (PRILOSEC) 20 MG delayed release capsule TAKE ONE CAPSULE BY MOUTH 2 TIMES EVERY DAY FOR 90 DAYS      ondansetron (ZOFRAN) 4 MG tablet TAKE 1 2 TABLET BY ORAL ROUTE EVERY 8 HOURS AS NEEDED FOR 90 DAYS      TRINTELLIX 20 MG TABS tablet TAKE 1 TABLET (20 MG) BY ORAL ROUTE ONCE DAILY AT THE SAME TIME EACH DAY FOR 90 DAYS      temazepam (RESTORIL) 30 MG capsule TAKE 1 CAPSULE BY MOUTH AT BEDTIME         ALLERGIES    Allergies   Allergen Reactions    Penicillins Anaphylaxis       SOCIAL & FAMILY HISTORY    Social History     Socioeconomic History    Marital status:      Spouse name: None    Number of children: None    Years of education: None    Highest education level: None   Tobacco Use    Smoking status: Never    Smokeless tobacco: Never   Vaping Use    Vaping Use: Never used   Substance and Sexual Activity    Alcohol use: Never    Drug use: Never     History reviewed. No pertinent family history. PHYSICAL EXAM    VITAL SIGNS: BP (!) 141/82   Pulse 82   Temp 97.9 °F (36.6 °C) (Oral)   Resp 18   Ht 5' 1\" (1.549 m)   Wt 143 lb (64.9 kg)   SpO2 99%   BMI 27.02 kg/m²   Constitutional:  Well developed, Appears comfortable  HEENT:  Normocephalic, Atraumatic. PERRL, EOMI. Musculoskeletal: Tenderness with palpation over the hand, third, and fourth digits throughout. Full range of motion with flexion and extension At the MCPs, PIPs, and DIPs. Decreased range of motion in the fourth digit at the DIP. Bleeding is controlled at this time. Vascular: Distal pulses and capillary refill intact.     Integument:     There is a laceration measuring approximately 2 centimeters in length on the palmar aspect of the left fourth digit just beyond the DIP. Bleeding is controlled at this time. No obvious foreign body on initial inspection. No obvious tendon involvement    Distal sensation, capillary refill intact. Distal joints with full range of motion    See below for further details. Neurologic:  Awake and alert, normal flow of speech. CN 2-12 intact. Psychiatric: Cooperative, pleasant affect        RADIOLOGY/PROCEDURES    XR HAND LEFT (MIN 3 VIEWS)   Final Result   1. Nondisplaced fracture noted through the proximal aspect of the 4th distal   phalanx, only seen on one view, with adjacent soft tissue swelling.               ________________________________________________________________________       Procedure Note - KASH Mclean - NANNETTE, ALEXIS      Laceration Repair Procedure Note    Indication: Skin Laceration -     Procedure:   - Procedure explained, including risks and benefits explained to the patient who expressed understanding. All questions were answered. Verbal consent obtained. - The Wound was prepped and draped in the usual sterile fashion using Betadine and sterile saline.  - The wound is anesthetized using 1% lidocaine, approximately 5 ml using a digital block. Anatomical landmarks identified no blood with aspiration. Anesthesia achieved. - Wound was explored to it's depth:    no foreign bodies. no compromise of neurovascular or tendon structures  - Wound was irrigated with copious amounts of sterile saline and mechanically debrided utilizing sterile gauze. - The laceration was Closed with 5-0 Ethilon sutures, total number of 6,  simple interrupted  - Hemostasis and good cosmesis was achieved. Blood loss minimal.  - The wound area was then dressed with Sterile nonstick dressing, sterile gauze, and tape. - Patient tolerated procedure well without complications.     Post procedure exam of the affected region reveals motor, capillary refill, and pulses intact    Total repaired wound length: 2 cm  ________________________________________________________________________          ED COURSE & MEDICAL DECISION MAKING    60-year-old female presents emergency department with complaints of left hand pain specifically of her second, third, fourth, and fifth fingers. The left ring finger has a laceration present just beyond the DIP on the palmar aspect. X-ray was obtained and that phalanx is fractured. She does have full range of motion and I have low suspicion for ligament injury. Dr. Alley Naranjo was consulted via telephone and states she can follow-up in his office. Her left ring finger was placed in an aluminum finger splint. She was medicated with Toradol 30 mg IM and Percocet p.o. for pain. After verbal consent, her wound was repaired without difficulty. She was started on clindamycin for open fracture. She was instructed to call Dr. Alley Naranjo office tomorrow morning to schedule follow-up appointment. Take clindamycin as directed until gone, Percocet as directed for severe pain, and return here with worsening symptoms. Usual narcotic precautions given. She verbalized understanding and agrees to plan of care. I discussed possibility of infection, retained foreign body, tendon injury, nerve injury. Wound care instructions discussed with patient today. Return to emergency Department precautions were discussed in detail with patient who understands and agrees. Vital signs and nursing notes reviewed during ED course. All pertinent Lab data and radiographic results reviewed with patient at bedside. The patient and/or the family were informed of the results of any tests/labs/imaging, the treatment plan, and time was allotted to answer questions. Low suspicion for tendon injury, foreign body, or other emergent condition. Clinical  IMPRESSION    1.  Injury of left hand, initial encounter    2. Open fracture of phalanx of digit of hand, initial encounter              Comment: Please note this report has been produced using speech recognition software and may contain errors related to that system including errors in grammar, punctuation, and spelling, as well as words and phrases that may be inappropriate. If there are any questions or concerns please feel free to contact the dictating provider for clarification.       KASH Soares - NANNETTE  12/22/22 [id number]

## 2022-12-22 NOTE — DISCHARGE INSTRUCTIONS
Keep wound clean and dry. Wash with mild soap and water daily and as needed. Apply a thin layer of antibiotic ointment twice daily. Change bandage daily and as needed. Watch for signs of infection including redness, swelling, purulent drainage, warmth, fevers, or streaking. If any of these symptoms present, follow up with your primary care provider or return here for re-evaluation. Suture/staple removal in 7 days either at your primary care provider or here. Follow up with your primary care provider, within a week, call to schedule an appointment. Return to the emergency department with worsening symptoms. Keep splint clean and dry, leave in place until further instructions from orthopedics. Elevate extremity. Ibuprofen as directed for pain. Percocet as directed for severe pain. Follow up with orthopedics within a week, call tomorrow to schedule an appointment. Return to the emergency department with worsening symptoms. Follow-up with a primary care provider within a week, call today to schedule an appointment. Have them recheck your blood pressure it was elevated during today's stay. Take a stool softener daily while taking a narcotic. DO not drive a car, operate machinery, drink alcohol, sign legal documents, or do any activities that would be dangerous while under the influence of a sedative/narcoctic medication.

## 2025-07-02 ENCOUNTER — LAB (OUTPATIENT)
Age: 60
End: 2025-07-02
Payer: COMMERCIAL

## 2025-07-02 DIAGNOSIS — N95.1 SYMPTOMATIC MENOPAUSAL OR FEMALE CLIMACTERIC STATES: ICD-10-CM

## 2025-07-02 DIAGNOSIS — E03.9 PRIMARY HYPOTHYROIDISM: Primary | ICD-10-CM

## 2025-07-02 PROCEDURE — 36415 COLL VENOUS BLD VENIPUNCTURE: CPT | Performed by: OBSTETRICS & GYNECOLOGY

## 2025-07-03 LAB
ESTRADIOL SERPL-MCNC: 52 PG/ML
T4 FREE SERPL-MCNC: 0.9 NG/DL (ref 0.9–1.8)
TSH SERPL DL<=0.005 MIU/L-ACNC: 1.41 UIU/ML (ref 0.27–4.2)

## 2025-07-10 ENCOUNTER — TELEPHONE (OUTPATIENT)
Age: 60
End: 2025-07-10

## 2025-07-10 NOTE — TELEPHONE ENCOUNTER
On 6/23 per [employer] task- pt called in requesting increase on Estradiol patch, it was advised by you that she get blood work done. Results have returned in [facility]. Please review and advise, thanks.

## 2025-07-10 NOTE — TELEPHONE ENCOUNTER
Notify Jackeline that her thyroid labs were good and her estrogen levels were at the upper end of normal for menopause, so lets see if the recently increased estrogen dose that I sent through recently gives her some symptomatic relief.

## 2025-07-14 NOTE — TELEPHONE ENCOUNTER
Pt called and states gel was called in and she wants patches  Please resubmit new RX  [facility] [address]

## 2025-07-15 RX ORDER — ESTRADIOL 0.1 MG/D
1 FILM, EXTENDED RELEASE TRANSDERMAL
Qty: 8 PATCH | Refills: 10 | Status: SHIPPED | OUTPATIENT
Start: 2025-07-17 | End: 2025-07-17 | Stop reason: DRUGHIGH

## 2025-07-15 NOTE — TELEPHONE ENCOUNTER
Pt calling again to check on status. Please advise.       Sent Rx to Pharmacy. I sent through the patches.  I thought she said she was having allergic reactions to her patches and was requesting gel???

## 2025-07-16 RX ORDER — HYDROCHLOROTHIAZIDE 12.5 MG/1
CAPSULE ORAL
COMMUNITY

## 2025-07-16 NOTE — TELEPHONE ENCOUNTER
Pt called in and states that the patch sent in is not the right amount. She states that she was using 0.075 mg. It was working well. She is asking if you are able to fix this for her. Please advise.

## 2025-07-17 DIAGNOSIS — N95.1 VASOMOTOR SYMPTOMS DUE TO MENOPAUSE: Primary | ICD-10-CM

## 2025-07-17 RX ORDER — ESTRADIOL 0.07 MG/D
1 FILM, EXTENDED RELEASE TRANSDERMAL
Qty: 8 PATCH | Refills: 3 | Status: SHIPPED | OUTPATIENT
Start: 2025-07-17

## 2025-07-17 NOTE — TELEPHONE ENCOUNTER
I sent through the 0.075 twice weekly transdermal estradiol patches.  She is due for an annual exam in August.   I put a few refills on the script to give her time to schedule this.

## 2025-08-19 ENCOUNTER — OFFICE VISIT (OUTPATIENT)
Age: 60
End: 2025-08-19
Payer: COMMERCIAL

## 2025-08-19 VITALS
HEIGHT: 61 IN | SYSTOLIC BLOOD PRESSURE: 128 MMHG | DIASTOLIC BLOOD PRESSURE: 82 MMHG | WEIGHT: 153 LBS | BODY MASS INDEX: 28.89 KG/M2

## 2025-08-19 DIAGNOSIS — N95.1 VASOMOTOR SYMPTOMS DUE TO MENOPAUSE: ICD-10-CM

## 2025-08-19 DIAGNOSIS — Z13.820 SCREENING FOR OSTEOPOROSIS: ICD-10-CM

## 2025-08-19 DIAGNOSIS — Z01.419 ENCOUNTER FOR ANNUAL ROUTINE GYNECOLOGICAL EXAMINATION: Primary | ICD-10-CM

## 2025-08-19 PROCEDURE — 99396 PREV VISIT EST AGE 40-64: CPT | Performed by: OBSTETRICS & GYNECOLOGY

## 2025-08-19 RX ORDER — ESTRADIOL 0.07 MG/D
1 FILM, EXTENDED RELEASE TRANSDERMAL
Qty: 24 PATCH | Refills: 3 | Status: SHIPPED | OUTPATIENT
Start: 2025-08-21

## 2025-08-19 SDOH — ECONOMIC STABILITY: FOOD INSECURITY: WITHIN THE PAST 12 MONTHS, THE FOOD YOU BOUGHT JUST DIDN'T LAST AND YOU DIDN'T HAVE MONEY TO GET MORE.: NEVER TRUE

## 2025-08-19 SDOH — ECONOMIC STABILITY: FOOD INSECURITY: WITHIN THE PAST 12 MONTHS, YOU WORRIED THAT YOUR FOOD WOULD RUN OUT BEFORE YOU GOT MONEY TO BUY MORE.: NEVER TRUE

## 2025-08-19 ASSESSMENT — PATIENT HEALTH QUESTIONNAIRE - PHQ9
SUM OF ALL RESPONSES TO PHQ QUESTIONS 1-9: 0
SUM OF ALL RESPONSES TO PHQ QUESTIONS 1-9: 0
2. FEELING DOWN, DEPRESSED OR HOPELESS: NOT AT ALL
SUM OF ALL RESPONSES TO PHQ QUESTIONS 1-9: 0
1. LITTLE INTEREST OR PLEASURE IN DOING THINGS: NOT AT ALL
SUM OF ALL RESPONSES TO PHQ QUESTIONS 1-9: 0

## (undated) DEVICE — PREMIUM WET SKIN PREP TRAY: Brand: MEDLINE INDUSTRIES, INC.

## (undated) DEVICE — GOWN,SIRUS,FABRNF,RAGLAN,XL,ST,28/CS: Brand: MEDLINE

## (undated) DEVICE — TOWEL,OR,DSP,ST,BLUE,STD,6/PK,12PK/CS: Brand: MEDLINE

## (undated) DEVICE — TUBING, SUCTION, 9/32" X 10', STRAIGHT: Brand: MEDLINE

## (undated) DEVICE — YANKAUER,FLEXIBLE HANDLE,REGLR CAPACITY: Brand: MEDLINE INDUSTRIES, INC.

## (undated) DEVICE — GARMENT,MEDLINE,DVT,INT,CALF,MED, GEN2: Brand: MEDLINE

## (undated) DEVICE — PACK,BASIC,SIRUS,V: Brand: MEDLINE

## (undated) DEVICE — SPONGE LAP W18XL18IN WHT COT 4 PLY FLD STRUNG RADPQ DISP ST

## (undated) DEVICE — CATHETER,URETHRAL,VINYL,MALE,16",16 FR: Brand: MEDLINE

## (undated) DEVICE — GAUZE,SPONGE,4"X4",16PLY,XRAY,STRL,LF: Brand: MEDLINE

## (undated) DEVICE — GLOVE SURG SZ 75 L12IN THK75MIL DK GRN LTX FREE

## (undated) DEVICE — SYRINGE IRRIG 60ML SFT PLIABLE BLB EZ TO GRP 1 HND USE W/

## (undated) DEVICE — SUTURE ABSORBABLE BRAIDED 2-0 CT-1 27 IN UD VICRYL J259H

## (undated) DEVICE — DRAPE,LITHOTOMY,STERILE: Brand: MEDLINE

## (undated) DEVICE — GLOVE SURG SZ 7 CRM LTX FREE POLYISOPRENE POLYMER BEAD ANTI

## (undated) DEVICE — GLOVE ORANGE PI 7 1/2   MSG9075

## (undated) DEVICE — PACKING GZ W2INXL6FT WVN COT VAG RADPQ